# Patient Record
Sex: MALE | Race: WHITE
[De-identification: names, ages, dates, MRNs, and addresses within clinical notes are randomized per-mention and may not be internally consistent; named-entity substitution may affect disease eponyms.]

---

## 2021-01-21 ENCOUNTER — HOSPITAL ENCOUNTER (EMERGENCY)
Dept: HOSPITAL 47 - EC | Age: 41
Discharge: HOME | End: 2021-01-21
Payer: COMMERCIAL

## 2021-01-21 VITALS
HEART RATE: 73 BPM | SYSTOLIC BLOOD PRESSURE: 125 MMHG | TEMPERATURE: 98.6 F | RESPIRATION RATE: 18 BRPM | DIASTOLIC BLOOD PRESSURE: 76 MMHG

## 2021-01-21 DIAGNOSIS — Z20.822: ICD-10-CM

## 2021-01-21 DIAGNOSIS — J40: Primary | ICD-10-CM

## 2021-01-21 DIAGNOSIS — Z85.828: ICD-10-CM

## 2021-01-21 PROCEDURE — 99283 EMERGENCY DEPT VISIT LOW MDM: CPT

## 2021-01-21 PROCEDURE — 87635 SARS-COV-2 COVID-19 AMP PRB: CPT

## 2021-01-21 PROCEDURE — 71046 X-RAY EXAM CHEST 2 VIEWS: CPT

## 2021-01-21 NOTE — XR
EXAMINATION TYPE: XR chest 2V

 

DATE OF EXAM: 1/21/2021

 

COMPARISON: NONE

 

HISTORY: Cough. Short of breath.

 

TECHNIQUE:

 

FINDINGS: Heart and mediastinum are normal. Lungs are clear. Diaphragm is normal. Bony thorax appears
 normal.

 

IMPRESSION: Normal chest.

## 2021-01-21 NOTE — ED
General Adult HPI





- General


Chief complaint: Upper Respiratory Infection


Stated complaint: Sinus Issue, Cough


Time Seen by Provider: 01/21/21 17:27


Source: patient, RN notes reviewed


Mode of arrival: ambulatory


Limitations: no limitations





- History of Present Illness


Initial comments: 


This a 32-year-old male presents emergency Department chief complaint of cough 

congestion since January 3.  Patient states it started with nasal congestion 

which has not progressed into a productive cough.  Patient states that he has 

pain when he coughs but has no chest pain with exertion or at rest.  Patient 

states that his ribs.  Patient denies any history of PE or DVT no prior cardiac 

or lung disease.  Patient states that she's had no sick contacts.  Patient stat

es he has no other complaints.








- Related Data


                                  Previous Rx's











 Medication  Instructions  Recorded


 


Azithromycin [Zithromax Z-pack (6 0 mg PO AS DIRECTED #1 pack 01/21/21





tabs)]  


 


predniSONE 50 mg PO DAILY #5 tab 01/21/21











                                    Allergies











Allergy/AdvReac Type Severity Reaction Status Date / Time


 


No Known Allergies Allergy   Verified 01/21/21 17:06














Review of Systems


ROS Statement: 


Those systems with pertinent positive or pertinent negative responses have been 

documented in the HPI.





ROS Other: All systems not noted in ROS Statement are negative.





Past Medical History


Past Medical History: No Reported History


History of Any Multi-Drug Resistant Organisms: None Reported


Past Surgical History: Appendectomy, Cholecystectomy


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


Past Psychological History: No Psychological Hx Reported


Smoking Status: Never smoker


Past Alcohol Use History: None Reported


Past Drug Use History: None Reported





General Exam


Limitations: no limitations


General appearance: alert, in no apparent distress


Head exam: Present: atraumatic, normocephalic, normal inspection


Eye exam: Present: normal appearance, PERRL, EOMI.  Absent: scleral icterus, 

conjunctival injection, periorbital swelling


ENT exam: Present: normal exam, normal oropharynx, mucous membranes moist


Neck exam: Present: normal inspection, full ROM.  Absent: tenderness, 

meningismus, lymphadenopathy


Respiratory exam: Present: rhonchi.  Absent: normal lung sounds bilaterally, 

respiratory distress, wheezes, rales, stridor


Cardiovascular Exam: Present: regular rate, normal rhythm, normal heart sounds. 

Absent: systolic murmur, diastolic murmur, rubs, gallop, clicks


GI/Abdominal exam: Present: soft, normal bowel sounds.  Absent: distended, 

tenderness, guarding, rebound, rigid


Extremities exam: Absent: pedal edema, calf tenderness





Course


                                   Vital Signs











  01/21/21





  17:06


 


Temperature 98.6 F


 


Pulse Rate 73


 


Respiratory 18





Rate 


 


Blood Pressure 125/76


 


O2 Sat by Pulse 97





Oximetry 














Medical Decision Making





- Medical Decision Making





32-year-old presented for cough congestion.  Patient has no current complaints 

chest pain vitals are stable.  On auscultation there is some noted rhonchi and 

more concerning for infection.  covid is negative patient discharge on 

azithromycin return parameters were discussed.





- Lab Data


                                   Lab Results











  01/21/21 Range/Units





  18:00 


 


Coronavirus (PCR)  Not Detected  (Not Detectd)  














Disposition


Clinical Impression: 


 Bronchitis





Disposition: HOME SELF-CARE


Condition: Stable


Instructions (If sedation given, give patient instructions):  Upper Respiratory 

Infection (ED)


Additional Instructions: 


Please return to the Emergency Department if symptoms worsen or any other 

concerns.


Prescriptions: 


predniSONE 50 mg PO DAILY #5 tab


Azithromycin [Zithromax Z-pack (6 tabs)] 0 mg PO AS DIRECTED #1 pack


Is patient prescribed a controlled substance at d/c from ED?: No


Referrals: 


None,Stated [Primary Care Provider] - 1-2 days


Time of Disposition: 18:40

## 2021-03-01 ENCOUNTER — HOSPITAL ENCOUNTER (OUTPATIENT)
Dept: HOSPITAL 47 - LABMAIN | Age: 41
End: 2021-03-01
Attending: EMERGENCY MEDICINE
Payer: COMMERCIAL

## 2021-03-01 ENCOUNTER — HOSPITAL ENCOUNTER (OUTPATIENT)
Dept: HOSPITAL 47 - LABMAIN | Age: 41
Discharge: HOME | End: 2021-03-01
Attending: EMERGENCY MEDICINE
Payer: COMMERCIAL

## 2021-03-01 DIAGNOSIS — Z20.822: Primary | ICD-10-CM

## 2021-03-01 PROCEDURE — 87635 SARS-COV-2 COVID-19 AMP PRB: CPT
